# Patient Record
Sex: MALE | Race: WHITE
[De-identification: names, ages, dates, MRNs, and addresses within clinical notes are randomized per-mention and may not be internally consistent; named-entity substitution may affect disease eponyms.]

---

## 2018-09-21 ENCOUNTER — HOSPITAL ENCOUNTER (OUTPATIENT)
Dept: HOSPITAL 61 - SURG | Age: 53
Discharge: HOME | End: 2018-09-21
Attending: SURGERY
Payer: COMMERCIAL

## 2018-09-21 VITALS — WEIGHT: 207.6 LBS | HEIGHT: 67.3 IN | BODY MASS INDEX: 32.2 KG/M2

## 2018-09-21 VITALS — SYSTOLIC BLOOD PRESSURE: 110 MMHG | DIASTOLIC BLOOD PRESSURE: 68 MMHG

## 2018-09-21 DIAGNOSIS — Z83.3: ICD-10-CM

## 2018-09-21 DIAGNOSIS — K43.6: Primary | ICD-10-CM

## 2018-09-21 DIAGNOSIS — Z79.899: ICD-10-CM

## 2018-09-21 DIAGNOSIS — Z98.890: ICD-10-CM

## 2018-09-21 DIAGNOSIS — Z80.8: ICD-10-CM

## 2018-09-21 DIAGNOSIS — Z82.49: ICD-10-CM

## 2018-09-21 PROCEDURE — S0028 INJECTION, FAMOTIDINE, 20 MG: HCPCS

## 2018-09-21 PROCEDURE — 49653: CPT

## 2018-09-21 PROCEDURE — C1781 MESH (IMPLANTABLE): HCPCS

## 2018-09-21 RX ADMIN — FENTANYL CITRATE PRN MCG: 50 INJECTION INTRAMUSCULAR; INTRAVENOUS at 10:10

## 2018-09-21 RX ADMIN — FENTANYL CITRATE PRN MCG: 50 INJECTION INTRAMUSCULAR; INTRAVENOUS at 10:36

## 2018-09-21 RX ADMIN — FENTANYL CITRATE PRN MCG: 50 INJECTION INTRAMUSCULAR; INTRAVENOUS at 10:17

## 2018-09-21 NOTE — DISCH
DISCHARGE INSTRUCTIONS


Condition on Discharge


Condition on Discharge:  Stable





Activity After Discharge


Activity Instructions for Disc:  Avoid exertion


Other activity instructions:  no lifting more than 20 pounds for 2 weeks





Diet after Discharge


Diet after Discharge:  Regular





Wound Incision Care


Other wound/incision instructi:  a shower in 24 hours





Contacting the [facility] after DC


Call your doctor for:  If your condition worsens





Follow-Up


Follow up with:  Dr. Baez in 2 weeks











MARBELLA BAEZ MD Sep 21, 2018 09:11

## 2018-09-21 NOTE — PDOC4
Operative Note


Operative Note


Date: 9/21/2018


Preoperative diagnosis: Ventral  hernia incarcerated


Postoperative diagnosis: Same


Procedure: Robotic-assisted laparoscopic ventral hernia repair with mesh


Surgeon: Elpidio


Specimen: None


Dictation: Patient is a 53-year-old male is complaints of a bulge at his 

umbilicus he said for a number years and getting larger he does have some skin 

changes over this area and incarcerated omentum procedure of robotic-assisted 

laparoscopic ventral hernia repair with mesh was explained to the patient 

detail was benefits were also discussed including bleeding infection injury to 

intra-abdominal contents possibly necessitating further or open operations 

alternatives to this procedure also discussed with patient who seemed 

understanding gave both verbal and written consent to have the procedure 

performed. Patient was taken to the operating room placed in supine position 

general anesthesia was initiated once patient was asleep and intubated his 

abdomen was prepped and draped usual sterile fashion using ChloraPrep. Upper 

quadrant was injected with quarter percent Marcaine with epinephrine incisions 

made lead blade scalpel and a 5 mm Visiport was placed under direct 

visualization into the abdomen a pneumoperitoneum was created once this was 

complete 5 mm camera was placed within the abdomen and inspected no other at 

maladies were noted other than the ventral hernia with incarcerated omentum. At 

this point a da Jessy port was placed in the left mid abdomen and a second da 

Jessy port placed in the left lower abdomen the 5 mm Visiport was changed out 

for da Jessy port that point the da Jessy robot was brought in and docked all 

port sites surgeon went to the robotic console using grasper and Endo Alexis 

scissors the hernia contents were reduced and the hernia sac removed. The 

fascial defect was closed with a V lock 20 nonabsorbable suture. Bard ventral 

light ST mesh was then used to cover the hernia defect this was sewn into place 

with a running 20V lock absorbable suture. At this point the da Jessy robot was 

undocked all ports removed the port incisions were closed with 40 septic or 

Monocryl mass all Steri-Strips and island dressings were applied. Patient was 

waken next made in the operating room taken recovery in stable condition all 

sponge instrument needle counts listed as correct estimate blood loss 5 mL











MARBELLA BAEZ MD Sep 21, 2018 09:10